# Patient Record
(demographics unavailable — no encounter records)

---

## 2024-12-12 NOTE — REASON FOR VISIT
[FreeTextEntry1] : Patient presents for a follow up Cardiology. He is here for his lab test results as well. He was recently diagnosed with Influenza and was evaluated at an Urgent [facility] and then with his PCP.

## 2024-12-12 NOTE — PHYSICAL EXAM
[General Appearance - Well Developed] : well developed [Normal Appearance] : normal appearance [General Appearance - In No Acute Distress] : no acute distress [Normal Conjunctiva] : the conjunctiva exhibited no abnormalities [Normal Oral Mucosa] : normal oral mucosa [Respiration, Rhythm And Depth] : normal respiratory rhythm and effort [Exaggerated Use Of Accessory Muscles For Inspiration] : no accessory muscle use [Auscultation Breath Sounds / Voice Sounds] : lungs were clear to auscultation bilaterally [Heart Rate And Rhythm] : heart rate and rhythm were normal [Heart Sounds] : normal S1 and S2 [Arterial Pulses Normal] : the arterial pulses were normal [Edema] : no peripheral edema present [Abdomen Soft] : soft [Abdomen Tenderness] : non-tender [Abnormal Walk] : normal gait [Nail Clubbing] : no clubbing of the fingernails [Skin Turgor] : normal skin turgor [] : no rash [No Venous Stasis] : no venous stasis [Oriented To Time, Place, And Person] : oriented to person, place, and time [Impaired Insight] : insight and judgment were intact [Affect] : the affect was normal [Memory Recent] : recent memory was not impaired

## 2024-12-12 NOTE — DISCUSSION/SUMMARY
[FreeTextEntry1] : Patient was instructed to target his T. Cholesterol to less than 200 mg/dl and LDL cholesterol to less than 100 mg/dl. He is now on lipitor 10 mg QHS and tolerating his medication. He was advised to follow a low fat, low cholesterol diet. weight loss was advised. EKG: NSR, rate of 87 bpm. EKG was reviewed with him. Patient was advised to increase his HDL cholesterol by exercising regularly. He will go for a repeat  CBC , fasting lipid profile and hepatic panel prior in 6 months and prior to his next office visit. RV in 1 year [EKG obtained to assist in diagnosis and management of assessed problem(s)] : EKG obtained to assist in diagnosis and management of assessed problem(s)

## 2025-04-21 NOTE — HISTORY OF PRESENT ILLNESS
[TextBox_4] : 64 y/o M presents to f/u MYRIAM    Pt rates sleep quality as: good Pt identifies therapy as beneficial  Pt reports compliance as: nightly Pt concerns r/t CPAP/ BiPAP therapy: using nasal mask, occasional dry mouth, losing 1/2 the water volume/ night

## 2025-04-21 NOTE — ASSESSMENT
[FreeTextEntry1] : MYRIAM:  PLAN: The patient is using and benefitting from the PAP device. There is good compliance with the CPAP. New supplies will be ordered today. Will try hybrid FF mask or chinstrap with nasal mask  Continue weight loss recommended  I stressed the need maintain compliance with the PAP device. The patient is not to use an Ozone or UV sterilizer. The patient was educated in the absolute requirement to use the PAP device nightly The patient was educated about the long-term benefits of appropriate MYRIAM management   F/U in 12 months.